# Patient Record
Sex: FEMALE | Race: WHITE | Employment: FULL TIME | URBAN - METROPOLITAN AREA
[De-identification: names, ages, dates, MRNs, and addresses within clinical notes are randomized per-mention and may not be internally consistent; named-entity substitution may affect disease eponyms.]

---

## 2019-11-17 ENCOUNTER — HOSPITAL ENCOUNTER (EMERGENCY)
Age: 29
Discharge: HOME | End: 2019-11-17
Attending: EMERGENCY MEDICINE
Payer: COMMERCIAL

## 2019-11-17 VITALS
DIASTOLIC BLOOD PRESSURE: 83 MMHG | BODY MASS INDEX: 57.52 KG/M2 | TEMPERATURE: 98.6 F | HEIGHT: 60 IN | SYSTOLIC BLOOD PRESSURE: 118 MMHG | RESPIRATION RATE: 14 BRPM | OXYGEN SATURATION: 98 % | WEIGHT: 293 LBS | HEART RATE: 79 BPM

## 2019-11-17 DIAGNOSIS — J32.9 SINUSITIS, UNSPECIFIED CHRONICITY, UNSPECIFIED LOCATION: Primary | ICD-10-CM

## 2019-11-17 PROCEDURE — 99283 EMERGENCY DEPT VISIT LOW MDM: CPT

## 2019-11-17 RX ORDER — DOXYCYCLINE HYCLATE 100 MG
100 TABLET ORAL 2 TIMES DAILY
Qty: 20 TABLET | Refills: 0 | Status: SHIPPED | OUTPATIENT
Start: 2019-11-17 | End: 2019-11-27

## 2019-11-17 RX ORDER — GABAPENTIN 100 MG/1
100 CAPSULE ORAL 3 TIMES DAILY
COMMUNITY

## 2019-11-17 RX ORDER — DEXTROMETHORPHAN HYDROBROMIDE AND PROMETHAZINE HYDROCHLORIDE 15; 6.25 MG/5ML; MG/5ML
5 SYRUP ORAL 4 TIMES DAILY PRN
Qty: 100 ML | Refills: 0 | Status: SHIPPED | OUTPATIENT
Start: 2019-11-17 | End: 2019-11-24

## 2019-11-17 ASSESSMENT — PAIN DESCRIPTION - DESCRIPTORS: DESCRIPTORS: OTHER (COMMENT)

## 2019-11-17 ASSESSMENT — PAIN DESCRIPTION - LOCATION: LOCATION: THROAT

## 2019-11-17 ASSESSMENT — PAIN DESCRIPTION - PAIN TYPE: TYPE: ACUTE PAIN

## 2019-11-17 ASSESSMENT — PAIN SCALES - GENERAL: PAINLEVEL_OUTOF10: 10

## 2019-12-30 ENCOUNTER — HOSPITAL ENCOUNTER (EMERGENCY)
Age: 29
Discharge: HOME | End: 2019-12-30
Attending: EMERGENCY MEDICINE
Payer: COMMERCIAL

## 2019-12-30 VITALS
DIASTOLIC BLOOD PRESSURE: 71 MMHG | HEART RATE: 58 BPM | OXYGEN SATURATION: 100 % | WEIGHT: 135.6 LBS | BODY MASS INDEX: 26.62 KG/M2 | RESPIRATION RATE: 16 BRPM | SYSTOLIC BLOOD PRESSURE: 112 MMHG | TEMPERATURE: 97.6 F | HEIGHT: 60 IN

## 2019-12-30 LAB
A/G RATIO: 1.7 (ref 1.1–2.2)
ALBUMIN SERPL-MCNC: 4.3 G/DL (ref 3.4–5)
ALP BLD-CCNC: 54 U/L (ref 40–129)
ALT SERPL-CCNC: 24 U/L (ref 10–40)
ANION GAP SERPL CALCULATED.3IONS-SCNC: 11 MMOL/L (ref 3–16)
AST SERPL-CCNC: 20 U/L (ref 15–37)
BACTERIA: ABNORMAL /HPF
BASOPHILS ABSOLUTE: 0.1 K/UL (ref 0–0.2)
BASOPHILS RELATIVE PERCENT: 1 %
BILIRUB SERPL-MCNC: 0.7 MG/DL (ref 0–1)
BILIRUBIN URINE: NEGATIVE
BLOOD, URINE: ABNORMAL
BUN BLDV-MCNC: 13 MG/DL (ref 7–20)
CALCIUM SERPL-MCNC: 8.9 MG/DL (ref 8.3–10.6)
CHLORIDE BLD-SCNC: 105 MMOL/L (ref 99–110)
CLARITY: CLEAR
CO2: 26 MMOL/L (ref 21–32)
COLOR: YELLOW
CREAT SERPL-MCNC: 0.6 MG/DL (ref 0.6–1.1)
EOSINOPHILS ABSOLUTE: 0.1 K/UL (ref 0–0.6)
EOSINOPHILS RELATIVE PERCENT: 2.5 %
EPITHELIAL CELLS, UA: ABNORMAL /HPF
GFR AFRICAN AMERICAN: >60
GFR NON-AFRICAN AMERICAN: >60
GLOBULIN: 2.5 G/DL
GLUCOSE BLD-MCNC: 92 MG/DL (ref 70–99)
GLUCOSE URINE: NEGATIVE MG/DL
HCG(URINE) PREGNANCY TEST: NEGATIVE
HCT VFR BLD CALC: 39.3 % (ref 36–48)
HEMOGLOBIN: 13.7 G/DL (ref 12–16)
KETONES, URINE: NEGATIVE MG/DL
LEUKOCYTE ESTERASE, URINE: NEGATIVE
LIPASE: 34 U/L (ref 13–60)
LYMPHOCYTES ABSOLUTE: 2.3 K/UL (ref 1–5.1)
LYMPHOCYTES RELATIVE PERCENT: 41.4 %
MCH RBC QN AUTO: 30.5 PG (ref 26–34)
MCHC RBC AUTO-ENTMCNC: 34.9 G/DL (ref 31–36)
MCV RBC AUTO: 87.3 FL (ref 80–100)
MICROSCOPIC EXAMINATION: YES
MONOCYTES ABSOLUTE: 0.5 K/UL (ref 0–1.3)
MONOCYTES RELATIVE PERCENT: 8.2 %
MUCUS: ABNORMAL /LPF
NEUTROPHILS ABSOLUTE: 2.6 K/UL (ref 1.7–7.7)
NEUTROPHILS RELATIVE PERCENT: 46.9 %
NITRITE, URINE: NEGATIVE
PDW BLD-RTO: 13.6 % (ref 12.4–15.4)
PH UA: 6 (ref 5–8)
PLATELET # BLD: 268 K/UL (ref 135–450)
PMV BLD AUTO: 9.5 FL (ref 5–10.5)
POTASSIUM REFLEX MAGNESIUM: 4.2 MMOL/L (ref 3.5–5.1)
PROTEIN UA: NEGATIVE MG/DL
RBC # BLD: 4.51 M/UL (ref 4–5.2)
RBC UA: ABNORMAL /HPF (ref 0–2)
SODIUM BLD-SCNC: 142 MMOL/L (ref 136–145)
SPECIFIC GRAVITY UA: 1.02 (ref 1–1.03)
TOTAL PROTEIN: 6.8 G/DL (ref 6.4–8.2)
URINE TYPE: ABNORMAL
UROBILINOGEN, URINE: 0.2 E.U./DL
WBC # BLD: 5.5 K/UL (ref 4–11)
WBC UA: ABNORMAL /HPF (ref 0–5)

## 2019-12-30 PROCEDURE — 84703 CHORIONIC GONADOTROPIN ASSAY: CPT

## 2019-12-30 PROCEDURE — 96361 HYDRATE IV INFUSION ADD-ON: CPT

## 2019-12-30 PROCEDURE — 83690 ASSAY OF LIPASE: CPT

## 2019-12-30 PROCEDURE — 96374 THER/PROPH/DIAG INJ IV PUSH: CPT

## 2019-12-30 PROCEDURE — 99284 EMERGENCY DEPT VISIT MOD MDM: CPT

## 2019-12-30 PROCEDURE — 2580000003 HC RX 258: Performed by: EMERGENCY MEDICINE

## 2019-12-30 PROCEDURE — 96375 TX/PRO/DX INJ NEW DRUG ADDON: CPT

## 2019-12-30 PROCEDURE — 6370000000 HC RX 637 (ALT 250 FOR IP): Performed by: EMERGENCY MEDICINE

## 2019-12-30 PROCEDURE — 80053 COMPREHEN METABOLIC PANEL: CPT

## 2019-12-30 PROCEDURE — 6360000002 HC RX W HCPCS: Performed by: EMERGENCY MEDICINE

## 2019-12-30 PROCEDURE — 81001 URINALYSIS AUTO W/SCOPE: CPT

## 2019-12-30 PROCEDURE — 85025 COMPLETE CBC W/AUTO DIFF WBC: CPT

## 2019-12-30 RX ORDER — ACETAMINOPHEN 500 MG
1000 TABLET ORAL EVERY 8 HOURS PRN
Qty: 60 TABLET | Refills: 3 | Status: SHIPPED | OUTPATIENT
Start: 2019-12-30

## 2019-12-30 RX ORDER — ONDANSETRON 2 MG/ML
8 INJECTION INTRAMUSCULAR; INTRAVENOUS ONCE
Status: COMPLETED | OUTPATIENT
Start: 2019-12-30 | End: 2019-12-30

## 2019-12-30 RX ORDER — 0.9 % SODIUM CHLORIDE 0.9 %
1000 INTRAVENOUS SOLUTION INTRAVENOUS ONCE
Status: COMPLETED | OUTPATIENT
Start: 2019-12-30 | End: 2019-12-30

## 2019-12-30 RX ORDER — OMEPRAZOLE 20 MG/1
20 CAPSULE, DELAYED RELEASE ORAL
Qty: 28 CAPSULE | Refills: 0 | Status: SHIPPED | OUTPATIENT
Start: 2019-12-30 | End: 2020-01-13

## 2019-12-30 RX ORDER — ONDANSETRON 4 MG/1
4 TABLET, ORALLY DISINTEGRATING ORAL EVERY 6 HOURS PRN
Qty: 20 TABLET | Refills: 0 | Status: SHIPPED | OUTPATIENT
Start: 2019-12-30 | End: 2020-11-18

## 2019-12-30 RX ORDER — KETOROLAC TROMETHAMINE 30 MG/ML
30 INJECTION, SOLUTION INTRAMUSCULAR; INTRAVENOUS ONCE
Status: COMPLETED | OUTPATIENT
Start: 2019-12-30 | End: 2019-12-30

## 2019-12-30 RX ADMIN — ONDANSETRON 8 MG: 2 INJECTION INTRAMUSCULAR; INTRAVENOUS at 15:17

## 2019-12-30 RX ADMIN — SODIUM CHLORIDE 1000 ML: 9 INJECTION, SOLUTION INTRAVENOUS at 15:17

## 2019-12-30 RX ADMIN — LIDOCAINE HYDROCHLORIDE: 20 SOLUTION ORAL; TOPICAL at 15:22

## 2019-12-30 RX ADMIN — KETOROLAC TROMETHAMINE 30 MG: 30 INJECTION, SOLUTION INTRAMUSCULAR at 15:17

## 2019-12-30 ASSESSMENT — PAIN DESCRIPTION - LOCATION: LOCATION: ABDOMEN;GENERALIZED

## 2019-12-30 ASSESSMENT — PAIN DESCRIPTION - PAIN TYPE: TYPE: ACUTE PAIN

## 2019-12-30 ASSESSMENT — PAIN SCALES - GENERAL: PAINLEVEL_OUTOF10: 5

## 2019-12-30 ASSESSMENT — PAIN DESCRIPTION - DESCRIPTORS: DESCRIPTORS: ACHING

## 2019-12-30 NOTE — ED NOTES
Patient to ed with complaints of generalized abd pain which started 5 days ago, patient reports she has had intermittent constipation and diarrhea, patient reports she has vomited X2 in the past 5 days.      Yaa Mayer RN  12/30/19 1148

## 2019-12-30 NOTE — ED PROVIDER NOTES
EMERGENCY DEPARTMENT PHYSICIAN DOCUMENTATION      CHIEF COMPLAINT  Abdominal Pain (generalized)      Patient information was obtained from patient. History/Exam limitations: none. HISTORY OF PRESENT ILLNESS  Nile Dakins is a 26 y.o. female with complaint of Abdominal Pain (generalized)  . Patient is here for upper abdominal pain. Has a remote history of gastric ulcer. States the pain started about 5 days ago. Is sharp, immediately worse with eating, no radiation, associated with some occasional nausea vomiting, occasional diarrhea. No fevers or chills. REVIEW OF SYSTEMS  A full 10 point Review of Systems was performed and is negative aside from pertinent positives mentioned in HPI    ALLERGIES:  Allergies   Allergen Reactions    Latex     Imitrex [Sumatriptan]        PAST HISTORY  Past Medical History:   Diagnosis Date    MS (multiple sclerosis) ([facility] [address]        History reviewed. No pertinent family history. No current facility-administered medications on file prior to encounter. Current Outpatient Medications on File Prior to Encounter   Medication Sig Dispense Refill    gabapentin (NEURONTIN) 100 MG capsule Take 100 mg by mouth 3 times daily.       TIZANIDINE HCL PO Take by mouth      ocrelizumab (OCREVUS) 300 MG/10ML SOLN injection Infuse 300 mg intravenously once         Social History     Tobacco Use    Smoking status: Never Smoker    Smokeless tobacco: Never Used   Substance Use Topics    Alcohol use: Yes     Comment: social    Drug use: Not on file         EXAM:   Presentation Vital Signs: /72   Pulse 58   Temp 97.6 °F (36.4 °C) (Oral)   Resp 16   Ht 5' (1.524 m)   Wt 61.5 kg (135 lb 9.6 oz)   SpO2 100%   BMI 26.48 kg/m²     General: Well nourished, no acute distress  Head: No traumatic injury  ENT: MMM, no facial asymmetry, no nasal discharge  Eyes: EOM  Neck: No tracheal deviation or stridor  Lungs: Respirations clear to ausculation, no respiratory

## 2020-11-18 ENCOUNTER — HOSPITAL ENCOUNTER (EMERGENCY)
Age: 30
Discharge: HOME | End: 2020-11-18
Attending: EMERGENCY MEDICINE
Payer: MEDICAID

## 2020-11-18 VITALS
OXYGEN SATURATION: 100 % | RESPIRATION RATE: 16 BRPM | TEMPERATURE: 98.7 F | WEIGHT: 119.6 LBS | SYSTOLIC BLOOD PRESSURE: 108 MMHG | BODY MASS INDEX: 23.36 KG/M2 | HEART RATE: 78 BPM | DIASTOLIC BLOOD PRESSURE: 52 MMHG

## 2020-11-18 LAB
BILIRUBIN URINE: ABNORMAL
BLOOD, URINE: NEGATIVE
CLARITY: ABNORMAL
COLOR: YELLOW
GLUCOSE URINE: NEGATIVE MG/DL
KETONES, URINE: 40 MG/DL
LEUKOCYTE ESTERASE, URINE: NEGATIVE
MICROSCOPIC EXAMINATION: ABNORMAL
NITRITE, URINE: NEGATIVE
PH UA: 5.5 (ref 5–8)
PROTEIN UA: NEGATIVE MG/DL
S PYO AG THROAT QL: NEGATIVE
SPECIFIC GRAVITY UA: >=1.03 (ref 1–1.03)
URINE TYPE: ABNORMAL
UROBILINOGEN, URINE: 0.2 E.U./DL

## 2020-11-18 PROCEDURE — 87880 STREP A ASSAY W/OPTIC: CPT

## 2020-11-18 PROCEDURE — 99283 EMERGENCY DEPT VISIT LOW MDM: CPT

## 2020-11-18 PROCEDURE — 81003 URINALYSIS AUTO W/O SCOPE: CPT

## 2020-11-18 PROCEDURE — U0003 INFECTIOUS AGENT DETECTION BY NUCLEIC ACID (DNA OR RNA); SEVERE ACUTE RESPIRATORY SYNDROME CORONAVIRUS 2 (SARS-COV-2) (CORONAVIRUS DISEASE [COVID-19]), AMPLIFIED PROBE TECHNIQUE, MAKING USE OF HIGH THROUGHPUT TECHNOLOGIES AS DESCRIBED BY CMS-2020-01-R: HCPCS

## 2020-11-18 PROCEDURE — 87081 CULTURE SCREEN ONLY: CPT

## 2020-11-18 RX ORDER — DEXTROMETHORPHAN POLISTIREX 30 MG/5ML
60 SUSPENSION ORAL 2 TIMES DAILY PRN
Qty: 89 ML | Refills: 1 | Status: SHIPPED | OUTPATIENT
Start: 2020-11-18 | End: 2020-11-25

## 2020-11-18 RX ORDER — ONDANSETRON 4 MG/1
4 TABLET, ORALLY DISINTEGRATING ORAL EVERY 8 HOURS PRN
Qty: 12 TABLET | Refills: 1 | Status: SHIPPED | OUTPATIENT
Start: 2020-11-18

## 2020-11-18 ASSESSMENT — PAIN DESCRIPTION - PAIN TYPE: TYPE: ACUTE PAIN

## 2020-11-18 ASSESSMENT — PAIN SCALES - GENERAL: PAINLEVEL_OUTOF10: 8

## 2020-11-18 ASSESSMENT — PAIN DESCRIPTION - LOCATION: LOCATION: HEAD

## 2020-11-18 ASSESSMENT — PAIN DESCRIPTION - DESCRIPTORS: DESCRIPTORS: ACHING

## 2020-11-18 NOTE — ED NOTES
Patient to ed with complaints of sinus congestion and headache which started yesterday, patient has concerns of COVID exposure.      Thong Collazo RN  11/18/20 6623

## 2020-11-18 NOTE — ED PROVIDER NOTES
TRIAGE CHIEF COMPLAINT:   No chief complaint on file. HPI: Carolyn Mckeon is a 31 y.o. female who presents to the Emergency Department with notable complaints for the past 2 days that are concerning to her for possible COVID-19 disease. She has had nasal congestion and some rhinorrhea as well as sore throat. Complains of fatigue. She states she sometimes feels slightly short of breath. She is coughing up some small amounts of yellow and green sputum. She has some nausea without vomiting or diarrhea. She is unsure about fever. Denies UTI symptoms other than frequency but that is common for her. She is requesting COVID-19 testing. Patient has a history of MS and is on a biologic treatment which she last received in September so is immunosuppressed. 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 Her last normal period was 1 week ago and she states she has not pregnant. REVIEW OF SYSTEMS:  6 systems reviewed. Pertinent positives per HPI. Otherwise noted to be negative. Nursing notes reviewed and agree with above. Past medical/surgical history reviewed. MEDICATIONS   Patient's Medications   New Prescriptions    No medications on file   Previous Medications    ACETAMINOPHEN (APAP EXTRA STRENGTH) 500 MG TABLET    Take 2 tablets by mouth every 8 hours as needed for Pain or Fever DO NOT TAKE MORE THAN 4000mg per day between all acetaminophen (tylenol) containing medications! GABAPENTIN (NEURONTIN) 100 MG CAPSULE    Take 100 mg by mouth 3 times daily.     OCRELIZUMAB (OCREVUS) 300 MG/10ML SOLN INJECTION    Infuse 300 mg intravenously once    OMEPRAZOLE (PRILOSEC) 20 MG DELAYED RELEASE CAPSULE    Take 1 capsule by mouth 2 times daily (before meals) for 14 days    ONDANSETRON (ZOFRAN ODT) 4 MG DISINTEGRATING TABLET    Take 1 tablet by mouth every 6 hours as needed for Nausea or Vomiting    TIZANIDINE HCL PO    Take by mouth   Modified Medications    No medications on file   Discontinued Medications    No medications on file         ALLERGIES   Allergies   Allergen Reactions    Latex     Imitrex [Sumatriptan]          BP (!) 108/52   Pulse 78   Temp 98.7 °F (37.1 °C) (Oral)   Resp 16   Wt 119 lb 9.6 oz (54.3 kg)   SpO2 100%   BMI 23.36 kg/m²   General:  No acute distress. Non toxic appearance. Not pale or diaphoretic. No respiratory distress. Rare cough. Head:   Normocephalic and atraumatic  Eyes:   Conjunctiva clear, ABDIEL, EOM's intact. Sclera anicteric. ENT:   Mucous membranes moist.  TMs are normal.  Mild left-sided nasal congestion. No sinus tenderness. Oropharynx is clear. Neck:   Supple. No adenopathy or jugular venous distension. No meningismus. Lungs/Chest:  No respiratory distress. Lungs are clear bilaterally. CVS:   Regular rate and rhythm  Abdomen:  Nontender  Extremities:  Full range of motion  Skin:   No rashes or lesions to exposed skin  Back:   No CVA tenderness  Neuro:  Alert and OX3. Speech clear and appropriate. No upper/lower extremity weakness. Normal sensation in all extremities. No facial asymmetry or weakness.  Gait normal.  Psych:   Affect normal. Mood normal        RADIOLOGY:      LAB  Labs Reviewed   URINALYSIS - Abnormal; Notable for the following components:       Result Value    Clarity, UA SL CLOUDY (*)     Bilirubin Urine SMALL (*)     Ketones, Urine 40 (*)     All other components within normal limits    Narrative:     Performed at:  [facility] [address],  [facility] [employer] [facility] [employer]   Phone [phone] A THROAT    Narrative:     Performed at:  [facility] [address],  [employer] [facility] [employer]   Phone [phone]   CULTURE, BET[employer] STREP CONFIRM PLATES   [id number]       ED COURSE / MDM:  53-year-old female with history of MS, immunosuppressed secondary to a biologic she received in September with 2-day history of complaints of nausea, occasional cough productive of yellow/green sputum, intermittent shortness of breath, chills, nasal congestion/rhinorrhea, sore throat and fatigue. She has some slight urinary frequency but this is typical for her MS. She is concerned about COVID-19. Patient is afebrile with room air O2 sat 100%. No respiratory distress. No clinical evidence for otitis media, pneumonia, meningitis or pyelonephritis. Rapid strep was negative. Urine ketones were 40 but urinalysis was otherwise normal.  I recommended the patient push fluids and gargle with warm salt water. Advised Tylenol or ibuprofen if needed for pain/fever. Recommended over-the-counter Delsym if needed for cough. I suggested Zofran if needed for nausea. I discussed with Taina Chou the results of the evaluation in the Emergency Department, diagnosis, care, prognosis and the importance of follow-up. The patient is stable for discharge. The patient and/or family are in agreement with the plan and all questions have been answered. Specific discharge instructions were explained, including reasons to return to the emergency department.       (Please note that portions of this note may have been completed with a voice recognition program.  Efforts were made to edit the dictation but occasionally words are mis-transcribed)        FINAL IMPRESSION:  1 --viral URI with cough  2 --nausea  3 --encounter for laboratory testing for Yokasta Pabon MD  11/19/20 [id number]

## 2020-11-18 NOTE — ED NOTES
Patient given prescription, work note, discharge instructions verbal and written, patient verbalized understanding. Alert/oriented X4, Clear speech.   Patient exhibits no distress, ambulates with steady gait per self leaving unit, no further request.     Payton Ortega RN  11/18/20 8726

## 2020-11-20 LAB — S PYO THROAT QL CULT: NORMAL

## 2020-11-23 LAB — SARS-COV-2: NOT DETECTED
